# Patient Record
Sex: FEMALE | Race: AMERICAN INDIAN OR ALASKA NATIVE
[De-identification: names, ages, dates, MRNs, and addresses within clinical notes are randomized per-mention and may not be internally consistent; named-entity substitution may affect disease eponyms.]

---

## 2017-06-07 ENCOUNTER — HOSPITAL ENCOUNTER (OUTPATIENT)
Dept: HOSPITAL 5 - OPU | Age: 52
Discharge: HOME | End: 2017-06-07
Attending: RADIOLOGY
Payer: MEDICARE

## 2017-06-07 VITALS — DIASTOLIC BLOOD PRESSURE: 100 MMHG | SYSTOLIC BLOOD PRESSURE: 158 MMHG

## 2017-06-07 DIAGNOSIS — Z98.890: ICD-10-CM

## 2017-06-07 DIAGNOSIS — T82.898A: Primary | ICD-10-CM

## 2017-06-07 DIAGNOSIS — Z79.899: ICD-10-CM

## 2017-06-07 DIAGNOSIS — Z98.51: ICD-10-CM

## 2017-06-07 DIAGNOSIS — Z99.2: ICD-10-CM

## 2017-06-07 DIAGNOSIS — N18.6: ICD-10-CM

## 2017-06-07 DIAGNOSIS — Z83.3: ICD-10-CM

## 2017-06-07 DIAGNOSIS — I12.0: ICD-10-CM

## 2017-06-07 DIAGNOSIS — N17.9: ICD-10-CM

## 2017-06-07 PROCEDURE — C1769 GUIDE WIRE: HCPCS

## 2017-06-07 PROCEDURE — 36902 INTRO CATH DIALYSIS CIRCUIT: CPT

## 2017-06-07 PROCEDURE — 76937 US GUIDE VASCULAR ACCESS: CPT

## 2017-06-07 PROCEDURE — C1894 INTRO/SHEATH, NON-LASER: HCPCS

## 2017-06-07 PROCEDURE — 84132 ASSAY OF SERUM POTASSIUM: CPT

## 2017-06-07 PROCEDURE — C1887 CATHETER, GUIDING: HCPCS

## 2017-06-07 PROCEDURE — 36415 COLL VENOUS BLD VENIPUNCTURE: CPT

## 2017-06-07 PROCEDURE — C1725 CATH, TRANSLUMIN NON-LASER: HCPCS

## 2017-06-07 PROCEDURE — C1751 CATH, INF, PER/CENT/MIDLINE: HCPCS

## 2017-06-07 NOTE — SHORT STAY SUMMARY
Short Stay Documentation


Date of service: 06/07/17


Narrative H&P: 


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





- Allergies and Medications


Current Medications: 


 Allergies





No Known Allergies Allergy (Verified 06/07/17 12:33)


 





 Home Medications











 Medication  Instructions  Recorded  Confirmed  Last Taken  Type


 


ALPRAZolam [Xanax TAB] 0.25 mg PO DAILY 06/07/17 06/07/17 06/06/17 History


 


Carvedilol [Coreg] 6.25 mg PO BID 06/07/17 06/07/17 06/06/17 History


 


Cinacalcet HCl [Sensipar] 90 mg PO QHS 06/07/17 06/07/17 06/06/17 History


 


Digoxin [Lanoxin] 0.125 mg PO DAILY 06/07/17 06/07/17 06/06/17 History


 


Dilayvite 800 mg PO DAILY 06/07/17 06/06/17 History


 


Esomeprazole Magnesium [NexIUM] 40 mg PO QDAY 06/07/17 06/07/17 06/06/17 History


 


Fluticasone [Flonase] 2 spray NS QDAY 06/07/17 06/07/17 06/06/17 History


 


Loratadine [Allergy Relief] 10 mg PO Q48H 06/07/17 06/07/17 06/06/17 History


 


Sevelamer Carbonate [Renvela] 800 mg PO TIDWM 06/07/17 06/07/17 06/06/17 History


 


traMADol [Ultram] 50 mg PO Q4HR PRN 06/07/17 06/07/17 Unknown History








Active Medications





Cefazolin Sodium (Ancef/Sterile Water 2 Gm/20 Ml)  2 gm in 20 mls @ 80 mls/hr 

IV PREOP NR


   PRN Reason: Protocol


   Stop: 06/07/17 23:00











- Physical exam


General appearance: no acute distress


HEENT: Other (severe facial and head edema)


Lungs: Normal air movement


Extremities: abnormal (large LUE AVF)





- Brief post op/procedure progress note


Date of procedure: 06/07/17


Pre-op diagnosis: SVC syndrome


Post-op diagnosis: same


Procedure: 





angioplasty


Anesthesia: local (w/ conscious sedation)


Surgeon: RICHMOND MA


Estimated blood loss: minimal


Condition: stable





- Hospital course


Hospital course: 





Ready for discharge.





- Disposition


Condition at discharge: Stable


Disposition: DC-01 TO HOME OR SELFCARE





- Discharge Diagnoses


(1) SVC syndrome


Status: Acute   





Short Stay Discharge Plan


Activity: advance as tolerated


Weight Bearing Status: Weight Bear as Tolerated


Diet: renal


Wound: keep clean and dry


Follow up with: 


PRIMARY CARE,MD [Primary Care Provider] - 7 Days

## 2017-06-07 NOTE — OPERATIVE REPORT
Operative Report


Operative Report: 





EXAM:


1.  Ultrasound-guided access of the left arm AV fistula towards the venous 

outflow


2.  Fistulogram


3.  Placement of a 6 Estonian sheath which was upsized to a 7 Estonian sheath


4.  Angioplasty of the superior vena cava with a 9 mm angioplasty balloon


5.  Angioplasty of the superior vena cava with an 8 mm cutting angioplasty 

balloon


6.  Angioplasty of the superior vena cava with a 10 mm conquest angioplasty 

balloon


7.  Angioplasty of the cephalic arch with a 10 mm conquest angioplasty balloon


8.  Angioplasty of the mid cephalic vein with a 10 mm conquest angioplasty 

balloon





DATE: 6/7/17





: RICHMOND MA MD





INDICATION: 51-year-old female with left upper extremity AV fistula with SVC 

syndrome.





MEDICATIONS: Please see nursing report for full details.





DEVICES:


8 mm cutting angioplasty balloon


9 mm angioplasty balloon


10 mm conquest angioplasty balloon





CONTRAST: Please see Lab report for full details.





PROCEDURE:


The risks, benefits, and alternatives were discussed with the patient; written 

informed consent was obtained.





The patient's left upper extremity was prepped and draped in a sterile fashion.

  





Ultrasound was used to evaluate the left upper extremity AV fistula which was 

patent.  Under direct ultrasound guidance, the left upper extremity AV fistula 

was accessed with a 21-gauge micropuncture needle.  0.018 wire was passed to 

the AV fistula.  Needle was exchanged for transitional dilator.  Transitional 

dilator was exchanged over 0.035 inch wire for 6 Estonian sheath.  





Digital subtraction angiography was performed demonstrating a left upper 

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  The 

midportion of the cephalic vein has a focal 40% narrowing.  There is a focal 40

% narrowing in the cephalic arch.  The left innominate vein is patent.  The SVC 

is not filling directly, and the azygous vein is filling through collaterals.





The sheath was then upsized to a 7 [place]ian 45 cm [place] destination positioned 

in the left innominate vein.





Digital subtraction angiography was performed demonstrating the SVC occlusion.





Using a vertebral catheter and a Glidewire advantage, the SVC occlusion was 

crossed.  Digital subtraction angiography was performed in the SVC confirming 

patency of the SVC a few centimeters below the azygous vein.  The SVC at the 

level of the azygous vein was occluded.





Wire was passed into the IVC.





9 mm angioplasty balloon was advanced over the wire and just perform 

angioplasty of the superior vena cava.  This was performed at burst pressure 

and slightly above burst pressure, but the focal narrowings persisted.





Wire was exchanged for a 0.018 inch treasure wire and 8 mm cutting balloon was 

advanced over the wire and used to perform cutting angioplasty at the area of 

focal narrowing.  Digital subtraction angiography demonstrated improved flow 

without any extravasation or pseudoaneurysm.





Afterwards, wire was exchanged for 0.035 inch Glidewire advantage and 10 mm 

angioplasty balloon was advanced over the wire.  Conquest balloon was used to 

perform angioplasty of the SVC at high pressure for prolonged period of time.  

Digital subtraction angiography was performed demonstrating improved flow into 

the SVC.  There is still a focal narrowing at the level of the azygous vein, 

but there is now flow through the SVC.





10 mm angioplasty balloon was then used to perform angioplasty of the cephalic 

arch and the mid cephalic vein.  Digital subtraction angiography demonstrated 

resolution of the narrowing at these areas.





7 Estonian sheath was then removed and the site was closed with 3-0 Vicryl.  

Patient tolerated the procedure well.  No immediate postprocedural complication.





FINDINGS: Please see procedure note above





IMPRESSION: 


1.  Successful cutting balloon angioplasty and high pressure angioplasty 

balloon angioplasty of the SVC with restoration of flow into the SVC.


2.  Angioplasty of the midportion of the cephalic vein and cephalic arch with a 

10 mm angioplasty balloon.

## 2017-06-08 NOTE — VASCULAR LAB REPORT
MISCELLANEOUS VESSEL IDENTIFICATION:



The arteriovenous access was identified in the left upper extremity and 

under real-time

ultrasound guidance was cannulated.





IMPRESSION:



Successful ultrasound guided cannulation of the arteriovenous access 

site.

## 2019-04-10 NOTE — ANESTHESIA CONSULTATION
Anesthesia Consult and Med Hx





- Airway


ROM Head & Neck: Adequate


Mental/Hyoid Distance: Adequate


Mallampati Class: Class II


Intubation Access Assessment: Probably Good





- Pulmonary Exam


CTA: Yes





- Cardiac Exam


Cardiac Exam: RRR





- Pre-Operative Health Status


ASA Pre-Surgery Classification: ASA4


Proposed Anesthetic Plan: General, Spinal





- Pre-Anesthesia Comment


Pre-Anesthesia Comments: Discussed SAB and GETA. Risks, Benefits, Options 

explained. Pt prefers to be knocked out and not know anything. Agrees to either





- Pulmonary


Hx Smoking: Yes (1 pack / week)


Hx Respiratory Symptoms: Yes ((+) TB - Resolved w/ Meds.Chronic bronchitis, no 

problems in several months)


Hx Pneumonia: Yes (Several years. Episode when diagnosed with HIV)


Hx Sleep Apnea: Yes





- Cardiovascular System


Hx Hypertension: Yes





- Central Nervous System


Hx Psychiatric Problems: Yes (Schizophrenia)





- Endocrine


Hx Renal Disease: Yes


Hx End Stage Renal Disease: Yes


Hx Liver Disease: Yes (Hep C)





- Hematic


Hx Anemia: Yes





- Other Systems


Hx Alcohol Use: Yes (1st stated 32 beers/week. Changed account to case/month)


Hx Substance Use: Yes (Former cocaine use. Quit ~1yr ago)


Hx Cancer: No


Hx Obesity: No





- Additional Comments


Anesthesia Medical History Comments: Pt with h/o chronic pain hips and knees. 

Ambulatory with crutches.  H/O: (+)HIV, (+)TB, (+) Hep C, (+) Cocaine, alcohol, 

and smoker, Anemia, HTN, Bronchitis

## 2019-04-11 ENCOUNTER — HOSPITAL ENCOUNTER (OUTPATIENT)
Dept: HOSPITAL 5 - CATHLABREC | Age: 54
Discharge: HOME | End: 2019-04-11
Attending: RADIOLOGY
Payer: MEDICARE

## 2019-04-11 VITALS — SYSTOLIC BLOOD PRESSURE: 168 MMHG | DIASTOLIC BLOOD PRESSURE: 105 MMHG

## 2019-04-11 DIAGNOSIS — T82.898A: Primary | ICD-10-CM
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
## 2019-04-11 DIAGNOSIS — Z79.82: ICD-10-CM
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
## 2019-04-11 DIAGNOSIS — G47.30: ICD-10-CM

## 2019-04-11 DIAGNOSIS — N18.6: ICD-10-CM

## 2019-04-11 DIAGNOSIS — E78.00: ICD-10-CM

## 2019-04-11 DIAGNOSIS — T82.591A: ICD-10-CM

## 2019-04-11 DIAGNOSIS — F41.9: ICD-10-CM

## 2019-04-11 DIAGNOSIS — Z98.890: ICD-10-CM

## 2019-04-11 DIAGNOSIS — Z79.899: ICD-10-CM

## 2019-04-11 DIAGNOSIS — Z99.2: ICD-10-CM

## 2019-04-11 DIAGNOSIS — Z72.89: ICD-10-CM

## 2019-04-11 DIAGNOSIS — Z86.2: ICD-10-CM

## 2019-04-11 DIAGNOSIS — K21.9: ICD-10-CM

## 2019-04-11 DIAGNOSIS — I12.0: ICD-10-CM

## 2019-04-11 DIAGNOSIS — Z95.5: ICD-10-CM

## 2019-04-11 DIAGNOSIS — F17.210: ICD-10-CM

## 2019-04-11 LAB
APTT BLD: 24 SEC. (ref 24.2–36.6)
INR PPP: 0.98 (ref 0.87–1.13)

## 2019-04-11 PROCEDURE — 85730 THROMBOPLASTIN TIME PARTIAL: CPT

## 2019-04-11 PROCEDURE — 76937 US GUIDE VASCULAR ACCESS: CPT

## 2019-04-11 PROCEDURE — 99157 MOD SED OTHER PHYS/QHP EA: CPT

## 2019-04-11 PROCEDURE — 85610 PROTHROMBIN TIME: CPT

## 2019-04-11 PROCEDURE — C1751 CATH, INF, PER/CENT/MIDLINE: HCPCS

## 2019-04-11 PROCEDURE — 36415 COLL VENOUS BLD VENIPUNCTURE: CPT

## 2019-04-11 PROCEDURE — 36902 INTRO CATH DIALYSIS CIRCUIT: CPT

## 2019-04-11 PROCEDURE — 99156 MOD SED OTH PHYS/QHP 5/>YRS: CPT

## 2019-04-11 PROCEDURE — C1769 GUIDE WIRE: HCPCS

## 2019-04-11 PROCEDURE — C1725 CATH, TRANSLUMIN NON-LASER: HCPCS

## 2019-04-11 PROCEDURE — C1894 INTRO/SHEATH, NON-LASER: HCPCS

## 2019-04-11 PROCEDURE — 84132 ASSAY OF SERUM POTASSIUM: CPT

## 2019-04-11 RX ADMIN — FENTANYL CITRATE ONE MCG: 50 INJECTION, SOLUTION INTRAMUSCULAR; INTRAVENOUS at 09:45

## 2019-04-11 RX ADMIN — MIDAZOLAM ONE MG: 1 INJECTION INTRAMUSCULAR; INTRAVENOUS at 09:45

## 2019-04-11 RX ADMIN — MIDAZOLAM ONE MG: 1 INJECTION INTRAMUSCULAR; INTRAVENOUS at 09:20

## 2019-04-11 RX ADMIN — SODIUM CHLORIDE SCH MLS: 0.9 INJECTION, SOLUTION INTRAVENOUS at 09:20

## 2019-04-11 RX ADMIN — SODIUM CHLORIDE SCH MLS/HR: 0.9 INJECTION, SOLUTION INTRAVENOUS at 08:40

## 2019-04-11 RX ADMIN — FENTANYL CITRATE ONE MCG: 50 INJECTION, SOLUTION INTRAMUSCULAR; INTRAVENOUS at 09:20

## 2019-04-11 NOTE — OPERATIVE REPORT
Operative Report


Operative Report: 





EXAM: 


1.  Ultrasound guided access of the AV fistula towards the venous outflow


2.  Fistulogram.


3.  Selection of the IVC.


4.  Angioplasty of the superior SVC and peripheral left innominate vein with a 8

mm x 2 cm cutting angioplasty balloon and 12 mm x 60 mm angioplasty balloon


5.  Angioplasty of the cephalic arch (central cephalic vein) with a 12 mm x 60 

mm angioplasty





DATE: 4/11/19





: RICHMOND MA MD





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





MEDICATIONS: Please see nursing report for full details.





DEVICES: 


8 mm x 2 cm cutting angioplasty balloon


12 mm x 6 cm angioplasty balloon





PROCEDURE: 


The risks, benefits, and alternatives of the procedure were discussed and 

written informed consent was obtained.  The patient was transported in stable 

condition to the angiography suite.  The patient's left arm AV fistula was 

assessed by ultrasound and was patent.  The patient was prepped and draped in a 

sterile fashion.





Under ultrasound guidance, the left arm AV brachiocephalic fistula was accessed 

with a 21-gauge micropuncture needle.  The area was anesthetized prior to 

access.  0.018 inch wire was advanced through the micropuncture needle into the 

fistula and then the needle was exchanged for a 5 Cuban transitional dilator.  

The inner dilator and wire were removed and a 0.035 inch wire was advanced 

through the venous outflow.  The transitional dilator was exchanged for a 7 

[place]ub[place] short sheath.





Fistulogram was performed of the venous outflow and central veins. 





The mid cephalic vein was patent.  The central cephalic vein had a 60% narrowing

(at the cephalic arch).  There were numerous collaterals arising from the left 

subclavian and innominate vein.  The left subclavian and mid and peripheral 

innominate vein were patent.  The central portion of the left innominate vein 

and the superior portion of the SVC was 90% narrowed.  The rest of the SVC was 

patent.





8 mm x 2 cm cutting angioplasty balloon was used to perform cutting angioplasty 

of the SVC and central left innominate vein.  12 mm x 6 cm angioplasty balloon 

was used to perform angioplasty of the SVC and central left innominate vein and 

then the cephalic arch.  Digital subtraction angiography demonstrated 20-30% 

residual narrowing of the left innominate vein/superior portion of the SVC and 

10-20% residual narrowing of the cephalic arch.





The wire was removed and the site was closed with a 3-0 Vicryl suture.  The 

sheath was then removed.  Hemostasis was achieved with slight manual c

ompression.  The patient was transported from the angiography suite to the floor

in stable condition.





IMPRESSION: 


1.  Successful central dialysis access angioplasty.


2.  Successful peripheral dialysis access angioplasty

## 2019-04-11 NOTE — SHORT STAY SUMMARY
Short Stay Documentation


Date of service: 04/11/19


Narrative H&P: 





53 year old female with ESRD and AVF malfunction with SVC syndrome.





- History


Principal diagnosis: AVF malfunction


Past Medical History: CAD (s/p stent), diabetes, dialysis, GERD, other (chronic 

SVC syndrome)


Past Surgical History: Other (AVF creation ; coronary stenting)


Social history: lives with family





- Allergies and Medications


Current Medications: 


                                    Allergies





No Known Allergies Allergy (Verified 06/07/17 12:33)


   





                                Home Medications











 Medication  Instructions  Recorded  Confirmed  Last Taken  Type


 


ALPRAZolam [Xanax TAB] 0.25 mg PO DAILY 06/07/17 04/11/19 04/07/19 History





    0.25mg 


 


Fluticasone [Flonase] 2 spray NS QDAY 06/07/17 04/11/19 04/10/19 History





    2 sprays 


 


Loratadine [Allergy Relief] 10 mg PO Q48H 06/07/17 04/11/19 04/10/19 History





    10mg 


 


Sevelamer Carbonate [Renvela] 800 mg PO TIDWM 06/07/17 04/11/19 04/10/19 History





    800mg 


 


Albuterol Sulfate [Albuterol 2 puff INHALATION 4XD PRN 04/11/19 04/11/19 

03/15/19 History





Sulfate Hfa]    2 p\uffs 


 


Aspirin EC [Aspirin Enteric Coated 81 mg PO DAILY 04/11/19 04/11/19 04/10/19 Hi

story





TAB]    81mg 


 


Atorvastatin [Lipitor] 80 mg PO DAILY 04/11/19 04/11/19 04/10/19 History





    80mg 


 


Lisinopril [Prinivil] 5 mg PO DAILY 04/11/19 04/11/19 04/09/19 History





    5mg 


 


Loratadine [Claritin] 10 mg PO DAILY 04/11/19 04/11/19 04/10/19 History





    10mg 


 


Metoprolol [Lopressor TAB] 0.5 tab PO BID 04/11/19 04/11/19 04/10/19 History





    0.5mg 


 


Mv,Dimitri,Min/Iron/Folic Acid/Lut 1 tab PO DAILY 04/11/19 04/11/19 04/10/19 History





[Complete Multi Tablet]    1 tab 


 


Ticagrelor [Brilinta] 90 mg PO BID 04/11/19 04/11/19 04/10/19 History





    90mg 


 


hydrOXYzine HCL [Atarax] 25 mg PO HS PRN 04/11/19 04/11/19 04/07/19 History





    25mg 


 


traMADol [Ultram 50 MG tab] 50 mg PO Q6H 04/11/19 04/11/19 04/10/19 History





    50mg 








Active Medications





Sodium Chloride (Nacl 0.9% 500 Ml)  500 mls @ 50 mls/hr IV AS DIRECT DIANDRA


   Last Admin: 04/11/19 08:40 Dose:  50 mls/hr


   Documented by: 











- Physical exam


General appearance: other (mild SVC syndrome)


Lungs: Normal air movement


Gastrointestinal: normal


Extremities: normal temperature, normal color, abnormal (large aneurysmal AVF, 

stable)





- Brief post op/procedure progress note


Date of procedure: 04/11/19


Pre-op diagnosis: ESRD, AVF malfunction


Post-op diagnosis: same


Procedure: 





angioplasty


Anesthesia: local (w/ conscious sed)


Surgeon: RICHMOND MA


Estimated blood loss: minimal


Condition: stable





- Hospital course


Hospital course: 





Tolerated procedure well.





- Disposition


Condition at discharge: Stable


Disposition: DC-01 TO HOME OR SELFCARE





- Discharge Diagnoses


(1) Malfunction of arteriovenous shunt


Status: Acute   





(2) SVC syndrome


Status: Acute   





Short Stay Discharge Plan


Activity: advance as tolerated


Weight Bearing Status: Weight Bear as Tolerated


Diet: renal


Wound: keep clean and dry


Additional Instructions: 


follow up with Primary Medical Doctor in 1 week, return to Emergency Room(ER) 

for medical emergency.


Follow up with: 


JOHN GRIDER MD [Primary Care Provider] - 7 Days


Forms:  AVG Arteriogram D/CInstruction

## 2019-07-11 ENCOUNTER — HOSPITAL ENCOUNTER (OUTPATIENT)
Dept: HOSPITAL 5 - CATHLABREC | Age: 54
Discharge: HOME | End: 2019-07-11
Attending: RADIOLOGY
Payer: MEDICARE

## 2019-07-11 VITALS — DIASTOLIC BLOOD PRESSURE: 89 MMHG | SYSTOLIC BLOOD PRESSURE: 139 MMHG

## 2019-07-11 DIAGNOSIS — Z79.899: ICD-10-CM

## 2019-07-11 DIAGNOSIS — Z98.890: ICD-10-CM

## 2019-07-11 DIAGNOSIS — T82.590A: Primary | ICD-10-CM

## 2019-07-11 DIAGNOSIS — I87.1: ICD-10-CM

## 2019-07-11 DIAGNOSIS — Z79.82: ICD-10-CM

## 2019-07-11 DIAGNOSIS — F41.9: ICD-10-CM

## 2019-07-11 DIAGNOSIS — I12.0: ICD-10-CM

## 2019-07-11 DIAGNOSIS — K21.9: ICD-10-CM
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
## 2019-07-11 DIAGNOSIS — N18.6: ICD-10-CM

## 2019-07-11 DIAGNOSIS — E78.00: ICD-10-CM

## 2019-07-11 DIAGNOSIS — Z86.2: ICD-10-CM

## 2019-07-11 DIAGNOSIS — Z72.89: ICD-10-CM

## 2019-07-11 DIAGNOSIS — G47.30: ICD-10-CM

## 2019-07-11 PROCEDURE — 84132 ASSAY OF SERUM POTASSIUM: CPT

## 2019-07-11 PROCEDURE — C1769 GUIDE WIRE: HCPCS

## 2019-07-11 PROCEDURE — 99156 MOD SED OTH PHYS/QHP 5/>YRS: CPT

## 2019-07-11 PROCEDURE — 99157 MOD SED OTHER PHYS/QHP EA: CPT

## 2019-07-11 PROCEDURE — 36902 INTRO CATH DIALYSIS CIRCUIT: CPT

## 2019-07-11 PROCEDURE — C1894 INTRO/SHEATH, NON-LASER: HCPCS

## 2019-07-11 PROCEDURE — 76937 US GUIDE VASCULAR ACCESS: CPT

## 2019-07-11 PROCEDURE — C1725 CATH, TRANSLUMIN NON-LASER: HCPCS

## 2019-07-11 PROCEDURE — 36415 COLL VENOUS BLD VENIPUNCTURE: CPT

## 2019-07-11 NOTE — OPERATIVE REPORT
Operative Report


Operative Report: 








EXAM: 


1.  Ultrasound guided access of the AV fistula towards the venous outflow


2.  Fistulogram.


3.  Selection of the IVC.


4.  Angioplasty of the superior SVC and peripheral left innominate vein with a 8

mm x 2 cm cutting angioplasty balloon and 12 mm x 60 mm angioplasty balloon


5.  Angioplasty of the cephalic arch (central cephalic vein) with a 8 mm x 2 cm 

cutting angioplasty balloon and 12 mm x 60 mm angioplasty





DATE: 7/11/19





: RICHMOND MA MD





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





MEDICATIONS: Please see nursing report for full details.





DEVICES: 


8 mm x 2 cm cutting angioplasty balloon


12 mm x 6 cm angioplasty balloon





PROCEDURE: 


The risks, benefits, and alternatives of the procedure were discussed and 

written informed consent was obtained.  The patient was transported in stable 

condition to the angiography suite.  The patient's left arm AV fistula was a

ssessed by ultrasound and was patent.  The patient was prepped and draped in a 

sterile fashion.





Under ultrasound guidance, the left arm AV brachiocephalic fistula was accessed 

with a 21-gauge micropuncture needle.  The area was anesthetized prior to 

access.  0.018 inch wire was advanced through the micropuncture needle into the 

fistula and then the needle was exchanged for a 5 Estonian transitional dilator.  

The inner dilator and wire were removed and a 0.035 inch wire was advanced 

through the venous outflow.  The transitional dilator was exchanged for a 7 

[place] short sheath.





Fistulogram was performed of the venous outflow and central veins. 





The central cephalic vein had a 60% narrowing (at the cephalic arch).  There 

were numerous collaterals arising from the left subclavian and innominate vein. 

The left subclavian and mid and peripheral innominate vein were patent.  The 

central portion of the left innominate vein and the superior portion of the SVC 

was 90% narrowed.  The rest of the SVC was patent.





8 mm x 2 cm cutting angioplasty balloon was used to perform cutting angioplasty 

of the SVC and central left innominate vein and cephalic arch. 12 mm x 6 cm 

angioplasty balloon was used to perform angioplasty of the SVC and central left 

innominate vein and then the cephalic arch.  Digital subtraction angiography 

demonstrated 20-30% residual narrowing of the left innominate vein/superior 

portion of the SVC and 20% residual narrowing of the cephalic arch.





The wire was removed and the site was closed with a 3-0 Vicryl suture.  The 

sheath was then removed.  Hemostasis was achieved with slight manual 

compression.  The patient was transported from the angiography suite to the 

floor in stable condition.





IMPRESSION: 


1.  Successful central dialysis access angioplasty.


2.  Successful peripheral dialysis access angioplasty

## 2019-07-11 NOTE — SHORT STAY SUMMARY
Short Stay Documentation


Date of service: 07/11/19


Narrative H&P: 





53 year old female with recurrent SVC syndrome and ESRD who presents for 

angioplasty.





- History


Principal diagnosis: SVC syndrome, AVF malfunction


H&P: obtained from office





- Allergies and Medications


Current Medications: 


                                    Allergies





No Known Allergies Allergy (Verified 06/07/17 12:33)


   





                                Home Medications











 Medication  Instructions  Recorded  Confirmed  Last Taken  Type


 


ALPRAZolam [Xanax TAB] 0.25 mg PO DAILY 06/07/17 07/11/19 07/10/19 History


 


Fluticasone [Flonase] 2 spray NS QDAY PRN 06/07/17 07/11/19 1 Week Ago History





    ~07/04/19 


 


Sevelamer Carbonate [Renvela] 800 mg PO TIDWM 06/07/17 07/11/19 07/10/19 History


 


Albuterol Sulfate [Albuterol 2 puff INHALATION 4XD PRN 04/11/19 07/11/19 07/08/19 History





Sulfate Hfa]     


 


Aspirin EC 81 mg PO DAILY 04/11/19 07/11/19 07/10/19 History


 


Atorvastatin [Lipitor] 80 mg PO DAILY 04/11/19 07/11/19 07/10/19 History


 


Lisinopril [Prinivil] 5 mg PO DAILY 04/11/19 07/11/19 07/10/19 History


 


Loratadine [Claritin] 10 mg PO DAILY 04/11/19 07/11/19 07/10/19 History


 


Metoprolol [Lopressor TAB] 0.5 tab PO BID 04/11/19 07/11/19 07/10/19 History


 


Mv,Dimitri,Min/Iron/Folic Acid/Lut 1 tab PO DAILY 04/11/19 07/11/19 07/10/19 History





[Complete Multi Tablet]     


 


Ticagrelor [Brilinta] 90 mg PO BID 04/11/19 07/11/19 07/10/19 History


 


hydrOXYzine HCL [Atarax] 25 mg PO HS PRN 04/11/19 07/11/19 07/10/19 History


 


traMADol [Ultram 50 MG tab] 50 mg PO Q6H 04/11/19 07/11/19 07/10/19 History














- Physical exam


General appearance: no acute distress


Lungs: Normal air movement (some coughing with supine position)


Gastrointestinal: normal


Extremities: normal temperature, normal color





- Brief post op/procedure progress note


Date of procedure: 07/11/19


Pre-op diagnosis: ESRD with SVC syndrome and AVF malfunction


Post-op diagnosis: same


Procedure: 


Peripheral dialysis access angioplasty


Central dialysis access angioplasty





Anesthesia: local (w/ conscious sedation)


Surgeon: RICHMOND MA


Estimated blood loss: minimal


Condition: stable





- Hospital course


Hospital course: 





Ready for discharge after 1 hr





- Disposition


Condition at discharge: Stable


Disposition: [place]-01 TO HOME OR SELFCARE





- Discharge Diagnoses


(1) Malfunction of arteriovenous shunt


Status: Acute   





(2) SVC syndrome


Status: Acute   





Short Stay Discharge Plan


Activity: advance as tolerated


Weight Bearing Status: Weight Bear as Tolerated


Diet: renal


Wound: keep clean and dry


Follow up with: 


JOHN GRIDER MD [Primary Care Provider] - 7 Days

## 2020-10-27 ENCOUNTER — HOSPITAL ENCOUNTER (OUTPATIENT)
Dept: HOSPITAL 5 - CATHLABREC | Age: 55
Discharge: HOME | End: 2020-10-27
Attending: SURGERY
Payer: MEDICARE

## 2020-10-27 DIAGNOSIS — Z86.2: ICD-10-CM

## 2020-10-27 DIAGNOSIS — Z79.899: ICD-10-CM

## 2020-10-27 DIAGNOSIS — T82.591A: Primary | ICD-10-CM

## 2020-10-27 DIAGNOSIS — Z87.01: ICD-10-CM

## 2020-10-27 DIAGNOSIS — Z98.890: ICD-10-CM

## 2020-10-27 DIAGNOSIS — I12.0: ICD-10-CM

## 2020-10-27 DIAGNOSIS — K21.9: ICD-10-CM

## 2020-10-27 DIAGNOSIS — I87.1: ICD-10-CM

## 2020-10-27 DIAGNOSIS — F41.9: ICD-10-CM

## 2020-10-27 DIAGNOSIS — Z95.5: ICD-10-CM

## 2020-10-27 DIAGNOSIS — N18.6: ICD-10-CM

## 2020-10-27 DIAGNOSIS — G47.30: ICD-10-CM

## 2020-10-27 DIAGNOSIS — Z79.82: ICD-10-CM

## 2020-10-27 DIAGNOSIS — Z72.89: ICD-10-CM

## 2020-10-27 PROCEDURE — C1725 CATH, TRANSLUMIN NON-LASER: HCPCS

## 2020-10-27 PROCEDURE — 36907 BALO ANGIOP CTR DIALYSIS SEG: CPT

## 2020-10-27 PROCEDURE — C1769 GUIDE WIRE: HCPCS

## 2020-10-27 PROCEDURE — 36415 COLL VENOUS BLD VENIPUNCTURE: CPT

## 2020-10-27 PROCEDURE — 80048 BASIC METABOLIC PNL TOTAL CA: CPT

## 2020-10-27 PROCEDURE — C1894 INTRO/SHEATH, NON-LASER: HCPCS

## 2020-10-27 PROCEDURE — 36902 INTRO CATH DIALYSIS CIRCUIT: CPT

## 2020-10-27 NOTE — SHORT STAY SUMMARY
Short Stay Documentation


Date of service: 10/27/20


Narrative H&P: 





55-year-old female with end-stage renal disease and left upper extremity AV 

access malfunction with recurrent SVC syndrome with head and neck swelling who 

presents for evaluation and treatment.





- History


Principal diagnosis:  AV fistula malfunction


Past Medical History: dialysis, ESRD


Past Surgical History: Other (AV access fistulograms and surgery)


Social history: no significant social history





- Allergies and Medications


Current Medications: 


                                    Allergies





No Known Allergies Allergy (Verified 06/07/17 12:33)


   





                                Home Medications











 Medication  Instructions  Recorded  Confirmed  Last Taken  Type


 


ALPRAZolam [Xanax TAB] 0.25 mg PO DAILY 06/07/17 12/17/19 12/16/19 History





    .25mg 


 


Fluticasone [Flonase] 2 spray NS QDAY PRN 06/07/17 12/17/19 09/20/19 History





    2 sprays 


 


Albuterol Sulfate [Albuterol 2 puff INHALATION 4XD PRN 04/11/19 12/17/19 12/11/19 History





Sulfate Hfa]    2 puffs 


 


Aspirin EC [Halfprin EC] 81 mg PO DAILY 04/11/19 12/17/19 12/16/19 History





    81 mg 


 


Atorvastatin [Lipitor] 80 mg PO DAILY 04/11/19 12/17/19 12/16/19 History





    80 mg 


 


Loratadine (Nf) [Claritin (Nf)] 10 mg PO DAILY 04/11/19 12/17/19 09/01/19 

History





    10 mg 


 


Metoprolol [Lopressor TAB] 0.5 tab PO BID 04/11/19 12/17/19 12/16/19 History





    12.5mg 


 


Mv,Dimitri,Min/Iron/Folic Acid/Lut 1 tab PO DAILY 04/11/19 12/17/19 12/15/19 History





[Complete Multi Tablet]    1 tab 


 


Ticagrelor [Brilinta] 90 mg PO BID 04/11/19 12/17/19 12/16/19 History





    90 mg 


 


lisinopriL [Prinivil] 5 mg PO DAILY 04/11/19 12/17/19 12/16/19 History





    5 mg 


 


traMADoL [Ultram 50 MG tab] 50 mg PO Q6H 04/11/19 12/17/19 12/15/19 History





    50 mg 


 


Ferric Citrate (Nf) [Auryxia] 4 tab PO AC 12/17/19 12/17/19 12/16/19 History





    4 tabs 


 


Hydralazine HCl 50 mg PO DAILY 12/17/19 12/17/19 12/16/19 History





    50 mg 


 


NIFEdipine XL [Procardia Xl] 60 mg PO DAILY 12/17/19 12/17/19 12/16/19 History





    60 mg 


 


carvediloL [Coreg] 25 mg PO BID 12/17/19 12/17/19 12/16/19 History





    25 mg 














- Physical exam


General appearance: mild distress (Head and neck swelling)


HEENT: Other (head and neck swelling)


Lungs: Other (good air movement)


Gastrointestinal: normal


Extremities: normal temperature, normal color





- Brief post op/procedure progress note


Date of procedure: 10/28/20


Pre-op diagnosis: AVF malfunction


Post-op diagnosis: same


Procedure: 





1.  Ultrasound-guided access of the left arm cephalic vein of the AV fistula


2.  Fistulogram


3.  Selection of the SVC with angiography.


4.  Angioplasty of the left distal innominate vein/proximal SVC with a 8 mm x 2 

cm cutting angioplasty balloon and then a 12 mm x 4 cm iNPACT balloon


5.  Angioplasty of the cephalic arch with a 12 mm x 4 cm iNPACT balloon


Anesthesia: local


Surgeon: RICHMOND MA


Estimated blood loss: minimal


Condition: stable





- Hospital course


Hospital course: 





Tolerated the procedure well.  No immediate postprocedural complications.





- Disposition


Condition at discharge: Stable


Disposition: DC-01 TO HOME OR SELFCARE





- Discharge Diagnoses


(1) ESRD (end stage renal disease) on dialysis


Status: Acute   





(2) Malfunction of arteriovenous shunt


Status: Acute   





(3) SVC syndrome


Status: Acute   





Short Stay Discharge Plan


Activity: advance as tolerated


Weight Bearing Status: Weight Bear as Tolerated


Diet: renal


Wound: keep clean and dry


Follow up with: 


JOHN GRIDER MD [Primary Care Provider] - 7 Days

## 2020-10-27 NOTE — OPERATIVE REPORT
Operative Report


Operative Report: 





1.  Ultrasound guided access of the AV fistula towards the venous outflow


2.  Fistulogram.


3.  Selection of the IVC.


4.  Angioplasty of the superior SVC and peripheral left innominate vein with a 8

mm x 2 cm cutting angioplasty balloon and 12 mm x 40 mm iNPACT balloon 


5.  Angioplasty of the cephalic arch (central cephalic vein) with a 12 mm x 40 

mm iNPACT angioplasty balloon





DATE: 10/27/2020





: RICHMOND MA MD





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





MEDICATIONS: Please see nursing report for full details.





DEVICES: 


8 mm x 2 cm cutting angioplasty balloon


12 mm x 4 cm iNPACT angioplasty balloon





PROCEDURE: 


The risks, benefits, and alternatives of the procedure were discussed and 

written informed consent was obtained.  The patient was transported in stable 

condition to the angiography suite.  The patient's left arm AV fistula was 

assessed by ultrasound and was patent.  The patient was prepped and draped in a 

sterile fashion.





Under ultrasound guidance, the left arm AV brachiocephalic fistula was accessed 

with a 21-gauge micropuncture needle.  The area was anesthetized prior to 

access.  0.018 inch wire was advanced through the micropuncture needle into the 

fistula and then the needle was exchanged for a 5 Luxembourger transitional dilator.  

The inner dilator and wire were removed and a 0.035 inch wire was advanced 

through the venous outflow.  The transitional dilator was exchanged for a 7 

Luxembourger short sheath.





Fistulogram was performed of the venous outflow and central veins. 





The central cephalic vein had a 50% narrowing (at the cephalic arch).  There 

were numerous collaterals arising from the left subclavian and innominate vein. 

The left subclavian and mid and peripheral innominate vein were patent.  The 

central portion of the left innominate vein and the superior portion of the SVC 

was 80% narrowed.  The rest of the SVC was patent.





8 mm x 2 cm cutting angioplasty balloon was used to perform cutting angioplasty 

of the SVC and central left innominate vein. Wire was exchanged for a Glidewire 

Advantage. Sheath was then upsized for a 9 Fr sheath. 12 mm x 4 cm iNPACT 

angioplasty balloon was used to perform angioplasty of the SVC and central left 

innominate vein and then the cephalic arch.  Digital subtraction angiography 

demonstrated 30% residual narrowing of the left innominate vein/superior portion

of the SVC and 20% residual narrowing of the cephalic arch.





The wire was removed and the site was closed with a 3-0 Vicryl suture.  The sh

eath was then removed.  Hemostasis was achieved with slight manual compression. 

The patient was transported from the angiography suite to the floor in stable 

condition.





IMPRESSION: 


1.  Successful central dialysis access angioplasty.


2.  Successful peripheral dialysis access angioplasty

## 2020-10-29 LAB
BUN SERPL-MCNC: 46 MG/DL (ref 7–17)
BUN/CREAT SERPL: 4 %
CALCIUM SERPL-MCNC: 10.7 MG/DL (ref 8.4–10.2)

## 2021-07-20 ENCOUNTER — LAB OUTSIDE AN ENCOUNTER (OUTPATIENT)
Dept: URBAN - METROPOLITAN AREA CLINIC 118 | Facility: CLINIC | Age: 56
End: 2021-07-20

## 2021-07-20 ENCOUNTER — DASHBOARD ENCOUNTERS (OUTPATIENT)
Age: 56
End: 2021-07-20

## 2021-07-20 ENCOUNTER — CLAIMS CREATED FROM THE CLAIM WINDOW (OUTPATIENT)
Dept: URBAN - METROPOLITAN AREA CLINIC 118 | Facility: CLINIC | Age: 56
End: 2021-07-20
Payer: COMMERCIAL

## 2021-07-20 DIAGNOSIS — R13.19 ESOPHAGEAL DYSPHAGIA: ICD-10-CM

## 2021-07-20 DIAGNOSIS — K21.9 GASTROESOPHAGEAL REFLUX DISEASE, UNSPECIFIED WHETHER ESOPHAGITIS PRESENT: ICD-10-CM

## 2021-07-20 DIAGNOSIS — R11.2 NON-INTRACTABLE VOMITING WITH NAUSEA, UNSPECIFIED VOMITING TYPE: ICD-10-CM

## 2021-07-20 DIAGNOSIS — R14.0 ABDOMINAL DISTENSION: ICD-10-CM

## 2021-07-20 PROCEDURE — 99244 OFF/OP CNSLTJ NEW/EST MOD 40: CPT | Performed by: INTERNAL MEDICINE

## 2021-07-20 PROCEDURE — 99204 OFFICE O/P NEW MOD 45 MIN: CPT | Performed by: INTERNAL MEDICINE

## 2021-07-20 RX ORDER — HYDROCORTISONE 10 MG/1
1 TABLET WITH FOOD OR MILK TABLET ORAL
Status: ACTIVE | COMMUNITY

## 2021-07-20 RX ORDER — CARVEDILOL 25 MG/1
1 TABLET WITH FOOD TABLET, FILM COATED ORAL TWICE A DAY
Status: ACTIVE | COMMUNITY

## 2021-07-20 RX ORDER — ONDANSETRON 4 MG/1
1 TABLET ON THE TONGUE AND ALLOW TO DISSOLVE TABLET, ORALLY DISINTEGRATING ORAL ONCE A DAY
Qty: 30 | Refills: 0 | OUTPATIENT
Start: 2021-07-20

## 2021-07-20 RX ORDER — CINACALCET HYDROCHLORIDE 90 MG/1
1 TABLET WITH FOOD OR AFTER A MEAL TABLET, COATED ORAL ONCE A DAY
Status: ACTIVE | COMMUNITY

## 2021-07-20 RX ORDER — PANTOPRAZOLE SODIUM 40 MG/1
1 TABLET TABLET, DELAYED RELEASE ORAL ONCE A DAY
Qty: 90 TABLET | Refills: 0 | OUTPATIENT
Start: 2021-07-20

## 2021-07-20 NOTE — PHYSICAL EXAM SKIN:
L arm AV fistulas present, no rashes,  no suspicious lesions,  no areas of discoloration,  no jaundice present,  good turgor,  no masses,  no tenderness on palpation

## 2021-07-20 NOTE — PHYSICAL EXAM HENT:
"Oncology Rooming Note    August 7, 2017 8:28 AM   Reuben Padilla is a 57 year old male who presents for:    Chief Complaint   Patient presents with     Oncology Clinic Visit     cruz majano      Initial Vitals: /76  Pulse 68  Temp 98.2  F (36.8  C) (Oral)  Wt (!) 163.6 kg (360 lb 11.2 oz)  SpO2 97%  BMI 41.69 kg/m2 Estimated body mass index is 41.69 kg/(m^2) as calculated from the following:    Height as of 7/25/17: 1.981 m (6' 5.99\").    Weight as of this encounter: 163.6 kg (360 lb 11.2 oz). Body surface area is 3 meters squared.  Data Unavailable Comment: Data Unavailable   No LMP for male patient.  Allergies reviewed: Yes  Medications reviewed: Yes    Medications: Medication refills not needed today.  Pharmacy name entered into [employer]: [id number] - [place], [place] - [address] [place] [address] AT [address]    Clinical concerns: no doc was NOT notified.    5 minutes for nursing intake (face to face time)     Raphael Fleming CMA              " Head,  normocephalic,  atraumatic,  Face,  Face within normal limits,  Ears,  External ears within normal limits,  Nose/Nasopharynx,  External nose  normal appearance,  nares patent,  no nasal discharge

## 2021-07-20 NOTE — HPI-TODAY'S VISIT:
54 yo F with PMH ESRD on dialysis MWF, cardiac stent placement was referred by Lisbeth Díaz for evaluation of dyspepsia, dysphagia. She reports sx onset x6 months ago, worse x1 month. She is complaining of dysphagia to solids only, nausea/vomiting (~1x/wk), upper abd pain, frequent heartburn. Has been on Nexium before, but not taking PPI now. She denies constipation/diarrhea, GI blood loss, wt loss. She states that shad EGD/colonoscopy last yr. EGD unremarkable, Colonoscopy had to be repeated 1 wk later per pt. Pt unsure of details. She will bring copy of records.

## 2021-07-20 NOTE — PHYSICAL EXAM GASTROINTESTINAL
Abdomen,  soft, nontender, abdominal distention,  no guarding or rigidity,  no masses palpable,  normal bowel sounds,  Liver and Spleen, no hepatosplenomegaly

## 2021-08-13 PROBLEM — 235595009: Status: ACTIVE | Noted: 2021-07-20

## 2021-08-13 PROBLEM — 271832001 FLATULENCE, ERUCTATION AND GAS PAIN: Status: ACTIVE | Noted: 2021-07-20

## 2021-08-13 PROBLEM — 40890009: Status: ACTIVE | Noted: 2021-07-20

## 2021-08-13 PROBLEM — 16932000: Status: ACTIVE | Noted: 2021-07-20

## 2021-08-26 ENCOUNTER — OFFICE VISIT (OUTPATIENT)
Dept: URBAN - METROPOLITAN AREA MEDICAL CENTER 16 | Facility: MEDICAL CENTER | Age: 56
End: 2021-08-26
Payer: COMMERCIAL

## 2021-08-26 DIAGNOSIS — K20.80 ESOPHAGITIS DISSECANS SUPERFICIALIS: ICD-10-CM

## 2021-08-26 DIAGNOSIS — K22.2 ACQUIRED ESOPHAGEAL RING: ICD-10-CM

## 2021-08-26 DIAGNOSIS — K29.60 ADENOPAPILLOMATOSIS GASTRICA: ICD-10-CM

## 2021-08-26 PROCEDURE — 43450 DILATE ESOPHAGUS 1/MULT PASS: CPT | Performed by: INTERNAL MEDICINE

## 2021-08-26 PROCEDURE — 43239 EGD BIOPSY SINGLE/MULTIPLE: CPT | Performed by: INTERNAL MEDICINE

## 2021-08-26 RX ORDER — HYDROCORTISONE 10 MG/1
1 TABLET WITH FOOD OR MILK TABLET ORAL
Status: ACTIVE | COMMUNITY

## 2021-08-26 RX ORDER — CINACALCET HYDROCHLORIDE 90 MG/1
1 TABLET WITH FOOD OR AFTER A MEAL TABLET, COATED ORAL ONCE A DAY
Status: ACTIVE | COMMUNITY

## 2021-08-26 RX ORDER — ONDANSETRON 4 MG/1
1 TABLET ON THE TONGUE AND ALLOW TO DISSOLVE TABLET, ORALLY DISINTEGRATING ORAL ONCE A DAY
Qty: 30 | Refills: 0 | Status: ACTIVE | COMMUNITY
Start: 2021-07-20

## 2021-08-26 RX ORDER — CARVEDILOL 25 MG/1
1 TABLET WITH FOOD TABLET, FILM COATED ORAL TWICE A DAY
Status: ACTIVE | COMMUNITY

## 2021-08-26 RX ORDER — PANTOPRAZOLE SODIUM 40 MG/1
1 TABLET TABLET, DELAYED RELEASE ORAL ONCE A DAY
Qty: 90 TABLET | Refills: 0 | Status: ACTIVE | COMMUNITY
Start: 2021-07-20

## 2021-09-02 ENCOUNTER — HOSPITAL ENCOUNTER (OUTPATIENT)
Dept: HOSPITAL 5 - CATH | Age: 56
Discharge: HOME | End: 2021-09-02
Attending: RADIOLOGY
Payer: MEDICARE

## 2021-09-02 VITALS — SYSTOLIC BLOOD PRESSURE: 166 MMHG | DIASTOLIC BLOOD PRESSURE: 106 MMHG

## 2021-09-02 DIAGNOSIS — Z79.82: ICD-10-CM

## 2021-09-02 DIAGNOSIS — K21.9: ICD-10-CM

## 2021-09-02 DIAGNOSIS — E78.00: ICD-10-CM

## 2021-09-02 DIAGNOSIS — I12.0: Primary | ICD-10-CM

## 2021-09-02 DIAGNOSIS — I87.1: ICD-10-CM

## 2021-09-02 DIAGNOSIS — N18.6: ICD-10-CM

## 2021-09-02 DIAGNOSIS — Z98.890: ICD-10-CM

## 2021-09-02 DIAGNOSIS — F41.9: ICD-10-CM

## 2021-09-02 DIAGNOSIS — Z79.899: ICD-10-CM

## 2021-09-02 PROCEDURE — 36415 COLL VENOUS BLD VENIPUNCTURE: CPT

## 2021-09-02 PROCEDURE — C1894 INTRO/SHEATH, NON-LASER: HCPCS

## 2021-09-02 PROCEDURE — 36902 INTRO CATH DIALYSIS CIRCUIT: CPT

## 2021-09-02 PROCEDURE — C1769 GUIDE WIRE: HCPCS

## 2021-09-02 PROCEDURE — C1725 CATH, TRANSLUMIN NON-LASER: HCPCS

## 2021-09-02 PROCEDURE — 84132 ASSAY OF SERUM POTASSIUM: CPT

## 2021-09-02 NOTE — OPERATIVE REPORT
Operative Report


Operative Report: 





Exam: Left upper arm fistulogram with central and peripheral venoplasty





Clinical indication: Patient with history of end-stage renal disease on 

hemodialysis through a left upper arm brachiocephalic fistula with cephalic arch

and central venous stenosis





Date: 09/02/2021





Procedure: Following an explanation of the risks, benefits and alternatives; 

written informed consent was obtained.  The patient was brought to the 

angiographic suite and placed in supine position on the examination table.  

Initial ultrasound evaluation of the fistula demonstrated multiple large 

pseudoaneurysms.  A palpable thrill was present throughout each.  The smallest 

inferior most pseudoaneurysm was chosen for cannulation.  Under ultrasound 

guidance, a 7 cm 21-gauge needle was advanced into the fistula.  A 0.018 

guidewire was advanced centrally.  The needle was removed and a microsheath 

placed.  A 0.018 guidewire was exchanged for a 0.035 guidewire and the micro 

sheath exchanged for a 7 Pakistani vascular sheath.





A vertebral catheter was utilized over the guidewire to manipulate the guidewire

centrally given the tortuosity of the fistula.  The guidewire was exchanged for 

an advantage guidewire which was advanced into the proximal right atrium.  The 7

Pakistani access sheath was exchanged for a 7 Pakistani 23 cm sheath to remove some of

the tortuosity.  Contrast was injected and imaging obtained at multiple 

locations.  This demonstrates no significant stenosis involving the SVC, left 

innominate, left subclavian and left cephalic vein.  Attempts to remove the 

tortuosity within the body of the fistula were performed using 8 mm x 40 mm 

balloon without significant reduction of the tortuosity.  The central veins and 

peripheral veins were treated using 12 mm x 40 mm balloon insufflated to 2 to 5 

osmar at multiple locations for 10 to 20 seconds throughout the length of the 

central venous system from the SVC to the cephalic arch.  Post intervention 

imaging demonstrated improved luminal flow throughout the cephalic arch and 

central veins.  At this point, the catheters, guidewires and sheaths were 

removed and hemostasis achieved using a 3-0 Vicryl suture, manual compression 

and Dermabond.  Sterile dressing was applied.





The patient tolerated the procedure well.  There were no immediate postprocedure

complications.





A minimal amount of conscious sedation was performed under the guidance of 

radiologic nursing.  Continuous cardiopulmonary monitoring was utilized.





Impression: 1) Left upper extremity fistulogram demonstrating significant 

stenosis within the SVC, left innominate vein, left subclavian vein and left 

cephalic arch.  Multiple pseudoaneurysms are present within the fistula.  2) 

Venoplasty of the central veins and cephalic arch using a 12 mm x 40 mm balloon 

with brisk flow and luminal flow at the conclusion of the procedure.  The 

patient will return when she experiences recurrent swelling for additional 

central venoplasty.

## 2021-09-02 NOTE — SHORT STAY SUMMARY
Short Stay Documentation


Date of service: 09/02/21





- History


Principal diagnosis: Malfunctioning dialysis access


Past Medical History: dialysis, ESRD


Past Surgical History: Other (Left upper arm AV fistula with central venous 

stenosis)


Social history: no significant social history





- Allergies and Medications


Current Medications: 


                                    Allergies





No Known Allergies Allergy (Verified 06/07/17 12:33)


   





                                Home Medications











 Medication  Instructions  Recorded  Confirmed  Last Taken  Type


 


ALPRAZolam [Xanax TAB] 0.25 mg PO DAILY 06/07/17 09/02/21 09/01/21 History





    0.25mg 


 


Albuterol Sulfate [Albuterol 2 puff INHALATION 4XD PRN 04/11/19 09/02/21 08/31/21 History





Sulfate Hfa]    2 puffs 


 


Aspirin EC [Halfprin EC] 81 mg PO DAILY 04/11/19 09/02/21 09/01/21 History





    81 mg 


 


Atorvastatin [Lipitor] 80 mg PO DAILY 04/11/19 09/02/21 09/01/21 History





    80 mg 


 


Mv,Dimitri,Min/Iron/Folic Acid/Lut 1 tab PO DAILY 04/11/19 09/02/21 08/28/21 History





[Complete Multi Tablet]    1 tab 


 


lisinopriL [Prinivil] 5 mg PO DAILY 04/11/19 09/02/21 09/01/21 History





    5 mg 


 


traMADoL [Ultram 50 MG tab] 50 mg PO Q6H 04/11/19 09/02/21 09/01/21 History





    50 mg 


 


Hydralazine HCl 50 mg PO DAILY 12/17/19 09/02/21 09/01/21 History





    50 mg 


 


NIFEdipine XL [Procardia Xl] 60 mg PO DAILY 12/17/19 09/02/21 09/01/21 History





    60 mg 


 


carvediloL [Coreg] 25 mg PO BID 12/17/19 09/02/21 09/01/21 History





    1000 








Active Medications





Sodium Chloride (Nacl 0.9% 500 Ml)  500 mls @ 50 mls/hr IV AS DIRECT DIANDRA











- Physical exam


General appearance: no acute distress


HEENT: Other (Head and neck swelling)


Lungs: Normal air movement


Breasts: deferred


Heart: Regular rate


Gastrointestinal: normal


Female Genitourinary: deferred


Rectal Exam: deferred


Extremities: abnormal (Left arm fistula with multiple pseudoaneurysms)


Neurological: Normal speech, Normal tone





- Brief post op/procedure progress note


Date of procedure: 09/02/21


Pre-op diagnosis: Central venous stenosis, malfunctioning dialysis access


Post-op diagnosis: same


Procedure: 





Left upper arm fistulogram with central and peripheral venoplasty


Anesthesia: local


Surgeon: RICHMOND JAMES


Estimated blood loss: minimal


Pathology: none


Condition: stable





- Disposition


Condition at discharge: Good


Disposition: 01 HOME / SELF CARE / HOMELESS





Short Stay Discharge Plan


Activity: advance as tolerated


Weight Bearing Status: Weight Bear as Tolerated


Diet: regular


Wound: keep clean and dry, per your surgeon's advice


Follow up with: 


JOHN GRIDER MD [Primary Care Provider] - 7 Days

## 2021-10-19 ENCOUNTER — ERX REFILL RESPONSE (OUTPATIENT)
Dept: URBAN - METROPOLITAN AREA CLINIC 118 | Facility: CLINIC | Age: 56
End: 2021-10-19

## 2021-10-19 RX ORDER — PANTOPRAZOLE SODIUM 40 MG/1
1 TABLET TABLET, DELAYED RELEASE ORAL ONCE A DAY
Qty: 90 TABLET | Refills: 0 | OUTPATIENT

## 2021-10-19 RX ORDER — PANTOPRAZOLE SODIUM 40 MG/1
1 TABLET TABLET, DELAYED RELEASE ORAL ONCE A DAY
Qty: 30 | Refills: 1 | OUTPATIENT

## 2021-10-19 RX ORDER — PANTOPRAZOLE SODIUM 40 MG/1
TAKE 1 TABLET BY MOUTH EVERY DAY TABLET, DELAYED RELEASE ORAL
Qty: 90 TABLET | Refills: 1 | OUTPATIENT

## 2021-11-01 ENCOUNTER — OFFICE VISIT (OUTPATIENT)
Dept: URBAN - METROPOLITAN AREA CLINIC 118 | Facility: CLINIC | Age: 56
End: 2021-11-01

## 2022-03-22 ENCOUNTER — HOSPITAL ENCOUNTER (OUTPATIENT)
Dept: HOSPITAL 5 - CATHLABREC | Age: 57
Discharge: HOME | End: 2022-03-22
Attending: RADIOLOGY
Payer: MEDICARE

## 2022-03-22 VITALS — SYSTOLIC BLOOD PRESSURE: 169 MMHG | DIASTOLIC BLOOD PRESSURE: 105 MMHG
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
## 2022-03-22 DIAGNOSIS — Z79.82: ICD-10-CM

## 2022-03-22 DIAGNOSIS — D64.9: ICD-10-CM

## 2022-03-22 DIAGNOSIS — Z98.890: ICD-10-CM

## 2022-03-22 DIAGNOSIS — E78.00: ICD-10-CM

## 2022-03-22 DIAGNOSIS — K21.9: ICD-10-CM

## 2022-03-22 DIAGNOSIS — I87.1: ICD-10-CM

## 2022-03-22 DIAGNOSIS — G47.30: ICD-10-CM

## 2022-03-22 DIAGNOSIS — N18.6: ICD-10-CM
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
## 2022-03-22 DIAGNOSIS — I12.0: ICD-10-CM

## 2022-03-22 DIAGNOSIS — Z79.899: ICD-10-CM

## 2022-03-22 DIAGNOSIS — T82.591A: Primary | ICD-10-CM

## 2022-03-22 DIAGNOSIS — Z87.01: ICD-10-CM

## 2022-03-22 LAB
APTT BLD: 28.3 SEC. (ref 24.2–36.6)
BUN SERPL-MCNC: 34 MG/DL (ref 7–17)
BUN/CREAT SERPL: 4 %
CALCIUM SERPL-MCNC: 9.7 MG/DL (ref 8.4–10.2)
HCT VFR BLD CALC: 32.5 % (ref 30.3–42.9)
HEMOLYSIS INDEX: 0
HGB BLD-MCNC: 10.3 GM/DL (ref 10.1–14.3)
INR PPP: 1.03 (ref 0.87–1.13)
MCHC RBC AUTO-ENTMCNC: 32 % (ref 30–34)
MCV RBC AUTO: 85 FL (ref 79–97)
PLATELET # BLD: 108 K/MM3 (ref 140–440)
RBC # BLD AUTO: 3.81 M/MM3 (ref 3.65–5.03)

## 2022-03-22 PROCEDURE — 80048 BASIC METABOLIC PNL TOTAL CA: CPT

## 2022-03-22 PROCEDURE — C1894 INTRO/SHEATH, NON-LASER: HCPCS

## 2022-03-22 PROCEDURE — 36415 COLL VENOUS BLD VENIPUNCTURE: CPT

## 2022-03-22 PROCEDURE — 85610 PROTHROMBIN TIME: CPT

## 2022-03-22 PROCEDURE — 85730 THROMBOPLASTIN TIME PARTIAL: CPT

## 2022-03-22 PROCEDURE — C1725 CATH, TRANSLUMIN NON-LASER: HCPCS

## 2022-03-22 PROCEDURE — C1769 GUIDE WIRE: HCPCS

## 2022-03-22 PROCEDURE — 36907 BALO ANGIOP CTR DIALYSIS SEG: CPT

## 2022-03-22 PROCEDURE — 85027 COMPLETE CBC AUTOMATED: CPT

## 2022-03-22 PROCEDURE — C1751 CATH, INF, PER/CENT/MIDLINE: HCPCS

## 2022-03-22 PROCEDURE — 36902 INTRO CATH DIALYSIS CIRCUIT: CPT

## 2022-03-22 NOTE — OPERATIVE REPORT
Operative Report


Operative Report: 


EXAM:


1.  Ultrasound guided access of the AV fistula towards the venous outflow


2.  Fistulogram.


3.  Selection of the IVC.


4.  Angioplasty of the superior SVC and peripheral left innominate vein with a 

10 mm x 4 cm angioplasty balloon and 12 mm x 60 mm angioplasty balloon


5.  Angioplasty of the cephalic arch (central cephalic vein) with a 12 mm x 60 

mm angioplasty





DATE: 3/22/2022





: RICHMOND MA MD





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





MEDICATIONS: Please see nursing report for full details.





DEVICES: 


10 mm x 4 cm angiosculpt angioplasty balloon


12 mm x 6 cm angioplasty balloon





PROCEDURE: 


The risks, benefits, and alternatives of the procedure were discussed and w

ritten informed consent was obtained.  The patient was transported in stable 

condition to the angiography suite.  The patient's left arm AV fistula was 

assessed by ultrasound and was patent.  The patient was prepped and draped in a 

sterile fashion.





Under ultrasound guidance, the left arm AV brachiocephalic fistula was accessed 

with a 21-gauge micropuncture needle.  The area was anesthetized prior to 

access.  0.018 inch wire was advanced through the micropuncture needle into the 

fistula and then the needle was exchanged for a 5 Slovenian transitional dilator.  

The inner dilator and wire were removed and a 0.035 inch wire was advanced 

through the venous outflow.  The transitional dilator was exchanged for a 7 

Slovenian short sheath.





Fistulogram was performed of the venous outflow and central veins. 





The central cephalic vein had a 40% narrowing (at the cephalic arch).  There 

were numerous collaterals arising from the left subclavian and innominate vein. 

The left subclavian and mid and peripheral innominate vein were patent.  The 

central portion of the left innominate vein and the superior portion of the SVC 

was 90% narrowed.  The rest of the SVC was patent.





0.035 inch wire was passed into the IVC and a V 18 wire was passed into the IVC 

to act as a cutting wire. 10 mm x 4 cm cutting angioplasty balloon was used to 

perform cutting angioplasty of the SVC and central left innominate vein.  

Afterwards, the V 18 wire was removed.  





12 mm x 6 cm angioplasty balloon was used to perform angioplasty of the SVC and 

central left innominate vein and then the cephalic arch.  Digital subtraction 

angiography demonstrated 20-30% residual narrowing of the left innominate 

vein/superior portion of the SVC and 20% residual narrowing of the cephalic 

arch.





The wire was removed.  The sheath was then removed.  Hemostasis was achieved 

with slight manual compression.  Pressure bandage applied.  The patient was 

transported from the angiography suite to the floor in stable condition.





IMPRESSION: 


1.  Successful central dialysis access angioplasty.


2.  Successful peripheral dialysis access angioplasty

## 2022-03-22 NOTE — SHORT STAY SUMMARY
Short Stay Documentation


Date of service: 03/22/22


Narrative H&P: 





56-year-old female with SVC syndrome and end-stage renal disease with AV fistula

malfunction.





- History


Principal diagnosis: SVC syndrome


H&P: obtained from office


Past Medical History: dialysis


Social history: no significant social history





- Allergies and Medications


Current Medications: 


                                    Allergies





No Known Allergies Allergy (Verified 06/07/17 12:33)


   





                                Home Medications











 Medication  Instructions  Recorded  Confirmed  Last Taken  Type


 


ALPRAZolam [Xanax TAB] 0.25 mg PO DAILY 06/07/17 03/22/22 03/19/22 History





    1 TAB 


 


Albuterol Sulfate [Albuterol 2 puff INHALATION 4XD PRN 04/11/19 03/22/22 02/28/22 History





Sulfate Hfa]    2 PUFF 


 


Aspirin EC [Halfprin EC] 81 mg PO DAILY 04/11/19 03/22/22 03/19/22 History





    1 TAB 


 


lisinopriL [Prinivil] 5 mg PO DAILY 04/11/19 03/22/22 03/19/22 History





    1 TAB 


 


traMADoL [Ultram 50 MG tab] 50 mg PO Q6H 04/11/19 03/22/22 03/21/22 History





    1 TAB 


 


Hydralazine HCl 50 mg PO DAILY 12/17/19 03/22/22 03/21/22 History





    1 TAB 


 


NIFEdipine XL [Procardia Xl] 60 mg PO DAILY 12/17/19 03/22/22 03/19/22 History





    1 TAB 


 


carvediloL [Coreg] 25 mg PO BID 12/17/19 03/22/22 03/21/22 History





    2 TAB 








Active Medications





Sodium Chloride (Nacl 0.9% 500 Ml)  500 mls @ 50 mls/hr IV AS DIRECT DIANDRA


   Stop: 03/22/22 20:00











- Physical exam


General appearance: no acute distress


HEENT: Other (Head and neck swelling)


Lungs: Normal air movement, Other (Coughing intermittently)


Gastrointestinal: normal


Extremities: normal temperature, normal color





- Brief post op/procedure progress note


Date of procedure: 03/22/22


Pre-op diagnosis: SVC syndrome


Post-op diagnosis: same


Procedure: 





Peripheral dialysis access angioplasty


Central dialysis access angioplasty





Anesthesia: local


Surgeon: RICHMOND MA


Estimated blood loss: minimal


Condition: stable





- Hospital course


Hospital course: 





Patient tolerated the procedure well.  No immediate postprocedural 

complications.





- Disposition


Condition at discharge: Stable


Disposition: 01 HOME / SELF CARE / HOMELESS





- Discharge Diagnoses


(1) ESRD (end stage renal disease) on dialysis


Status: Acute   





(2) Malfunction of arteriovenous shunt


Status: Acute   





(3) SVC syndrome


Status: Acute   





Short Stay Discharge Plan


Activity: advance as tolerated


Weight Bearing Status: Weight Bear as Tolerated


Wound: keep clean and dry


Additional Instructions: 


Follow up with primary medical doctor in 1 week , return to ER for medical 

emergency.


Follow up with: 


JOHN GRIDER MD [Primary Care Provider] - 7 Days


Forms:  AVG Arteriogram D/CInstruction

## 2022-04-22 ENCOUNTER — HOSPITAL ENCOUNTER (OUTPATIENT)
Dept: HOSPITAL 5 - CATHLABREC | Age: 57
Discharge: HOME | End: 2022-04-22
Attending: SURGERY
Payer: MEDICARE

## 2022-04-22 VITALS — DIASTOLIC BLOOD PRESSURE: 91 MMHG | SYSTOLIC BLOOD PRESSURE: 168 MMHG

## 2022-04-22 DIAGNOSIS — N18.6: ICD-10-CM

## 2022-04-22 DIAGNOSIS — E78.5: ICD-10-CM

## 2022-04-22 DIAGNOSIS — I87.1: ICD-10-CM

## 2022-04-22 DIAGNOSIS — Z98.890: ICD-10-CM

## 2022-04-22 DIAGNOSIS — I13.2: ICD-10-CM

## 2022-04-22 DIAGNOSIS — D64.9: ICD-10-CM

## 2022-04-22 DIAGNOSIS — T82.591A: Primary | ICD-10-CM

## 2022-04-22 DIAGNOSIS — Z87.01: ICD-10-CM

## 2022-04-22 DIAGNOSIS — Z79.899: ICD-10-CM

## 2022-04-22 DIAGNOSIS — Z79.82: ICD-10-CM
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
## 2022-04-22 DIAGNOSIS — G47.30: ICD-10-CM
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
## 2022-04-22 DIAGNOSIS — Z72.89: ICD-10-CM

## 2022-04-22 DIAGNOSIS — K21.9: ICD-10-CM

## 2022-04-22 DIAGNOSIS — F41.9: ICD-10-CM

## 2022-04-22 DIAGNOSIS — I50.9: ICD-10-CM

## 2022-04-22 PROCEDURE — C1887 CATHETER, GUIDING: HCPCS

## 2022-04-22 PROCEDURE — 36415 COLL VENOUS BLD VENIPUNCTURE: CPT

## 2022-04-22 PROCEDURE — C1874 STENT, COATED/COV W/DEL SYS: HCPCS

## 2022-04-22 PROCEDURE — C1725 CATH, TRANSLUMIN NON-LASER: HCPCS

## 2022-04-22 PROCEDURE — C1769 GUIDE WIRE: HCPCS

## 2022-04-22 PROCEDURE — C1894 INTRO/SHEATH, NON-LASER: HCPCS

## 2022-04-22 PROCEDURE — 37249 TRLUML BALO ANGIOP ADDL VEIN: CPT

## 2022-04-22 PROCEDURE — 36903 INTRO CATH DIALYSIS CIRCUIT: CPT

## 2022-04-22 PROCEDURE — C1751 CATH, INF, PER/CENT/MIDLINE: HCPCS

## 2022-04-22 PROCEDURE — 82962 GLUCOSE BLOOD TEST: CPT

## 2022-04-22 PROCEDURE — 37248 TRLUML BALO ANGIOP 1ST VEIN: CPT

## 2022-04-22 PROCEDURE — 84132 ASSAY OF SERUM POTASSIUM: CPT

## 2022-04-22 NOTE — SHORT STAY SUMMARY
Short Stay Documentation


Date of service: 04/22/22


Narrative H&P: 





The patient is a 56-year-old female with a history of end-stage renal disease 

who was on hemodialysis through a left arm arteriovenous fistula.  She has a 

known history of central venous occlusion and presented with complaints of 

severe left arm swelling as well as facial swelling with some difficulty 

breathing.  She is in need of a diagnostic fistulogram with possible 

intervention.  She was given the risk, benefits, and alternative procedures and 

consented to the procedure.





- History


Past Medical History: dialysis, ESRD, heart failure, hypertension, 

hyperlipidemia, other (Sleep apnea, superior vena cava syndrome)


Past Surgical History: Other (Creation of left arm arteriovenous fistula)


Social history: no significant social history





- Allergies and Medications


Current Medications: 


                                    Allergies





No Known Allergies Allergy (Verified 06/07/17 12:33)


   





                                Home Medications











 Medication  Instructions  Recorded  Confirmed  Last Taken  Type


 


ALPRAZolam [Xanax TAB] 0.25 mg PO DAILY 06/07/17 04/22/22 04/22/22 06:00 History


 


Albuterol Sulfate [Albuterol 2 puff INHALATION 4XD PRN 04/11/19 04/22/22 04/16/22 History





Sulfate Hfa]    2 puff 


 


Aspirin EC [Halfprin EC] 81 mg PO DAILY 04/11/19 04/22/22 04/20/22 History





    81 mg 


 


traMADoL [Ultram 50 MG tab] 50 mg PO Q6H 04/11/19 04/22/22 04/15/22 History





    50 mg 


 


Hydralazine HCl 50 mg PO DAILY 12/17/19 04/22/22 04/22/22 06:00 History





    50 mg 


 


NIFEdipine XL [Procardia Xl] 60 mg PO DAILY 12/17/19 04/22/22 04/21/22 History





    50 mg 


 


carvediloL [Coreg] 25 mg PO BID 12/17/19 04/22/22 04/21/22 History





    25 mg 


 


Atorvastatin [Lipitor] 80 mg PO HS 04/22/22 04/22/22 04/21/22 History





    80 mg 


 


Cinacalcet HCl [Sensipar] 120 mg PO BID 04/22/22 04/22/22 04/21/22 History





    120 mg 


 


Citalopram Hydrobromide 1 tab PO DAILY 04/22/22 04/22/22 04/22/22 06:00 History





[Citalopram HBr]    1 tab 


 


Folic Acid/Vit B Complex and C 800 mg PO DAILY 04/22/22 04/22/22 04/21/22 

History





[Dialyvite 800 Chewable Wafer]    800 mg 








Active Medications





Sodium Chloride (Nacl 0.9% 500 Ml)  500 mls @ 50 mls/hr IV AS DIRECT DIANDRA


   Stop: 04/22/22 18:00











- Physical exam


General appearance: no acute distress


Lungs: Normal air movement, Other (Breathing is somewhat labored secondary to 

severe head and tongue swelling)


Heart: Regular rate


Gastrointestinal: normal


Female Genitourinary: deferred


Rectal Exam: deferred


Extremities: abnormal (Left arm arteriovenous fistula is significantly dilated, 

increased pulsatility of the left arm AV fistula)





- Brief post op/procedure progress note


Date of procedure: 04/22/22


Pre-op diagnosis: Complications of Dialysis Access


Post-op diagnosis: same


Procedure: 





1.  Access Left Arm AV Fistula with 7 Hungarian Sheath Venous


2.  Diagnostic Fistulogram with Central Venogram


3.  Angioplasty and Stent of Left Arm AV Fistula With 10 x 40 Wall Balloon 

and 10 x 60 Covera Stent Graft


4.  Angioplasty of Left Innominate Vein With 12 x 40 Conquest Balloon


5.  Radiologic Supervision with Interpretation





Anesthesia: local


Surgeon: KATE MARI


Estimated blood loss: minimal


Pathology: none


Condition: stable





- Disposition


Condition at discharge: Good


Disposition: 01 HOME / SELF CARE / HOMELESS





Short Stay Discharge Plan


Activity: other (Okay to use the fistula for dialysis.)


Wound: open to air, keep clean and dry

## 2022-04-22 NOTE — OPERATIVE REPORT
Operative Report


Operative Report: 





Date of Procedure: 4/22/2022





Pre-operative Diagnosis: Complications of Dialysis Access





Post-operative Diagnosis: Same





Procedure(s):


1.  Access Left Arm AV Fistula with 7 [place] Sheath Venous


2.  Diagnostic Fistulogram with Central Venogram


3.  Angioplasty and Stent of Left Arm AV Fistula With 10 x 40 Manns Choice Balloon 

and 10 x 60 Covera Stent Graft


4.  Angioplasty of Left Innominate Vein With 12 x 40 Conquest Balloon


5.  Radiologic Supervision with Interpretation





Surgeon: Jer Oquendo M.D. 





Assistant: None





Anesthesia: 2% Lidocaine





EBL: Minimal





Counts: Correct





Complications: None





Condition: Stable





Specimen: None





Indication:





The patient is a 56-year-old female with a history of end-stage renal disease 

and known supra vena cava syndrome.  She presented with complaints of left arm 

and facial swelling with difficulty breathing.  She is in need of a diagnostic 

fistulogram with possible intervention.  She was given the risk, benefits, and 

alternative procedures and consented to the procedure.





Angiographic Findings:





The diagnostic fistulogram revealed a diffusely enlarged and left brachi

ocephalic arteriovenous fistula.  There was approximately 85% stenosis in the 

mid fistula and approximately 75% stenosis of the cephalic arch.  There was 90% 

stenosis of the proximal innominate vein.  The remainder the central venous 

system appeared to be patent without significant flow-limiting stenosis.





After intervention the residual stenosis within the cannulation zone of the 

fistula was approximately 40%.  The residual stenosis within the cephalic arch 

was reduced to approximately 20%.  The residual stenosis within the innominate 

vein was reduced to less than 15%.





Description of Procedure:





The patient was brought to the Cath Lab and laid in supine position.  After a 

timeout was performed her left arm was prepped and draped in normal sterile 

fashion.  2% lidocaine was used anesthetize skin and soft tissue overlying the 

fistula, near the arterial inflow, and a 21-gauge micropuncture needle was used 

to access the fistula towards the venous outflow.  A 0.018 micropuncture wire 

was advanced into the fistula and after removing the needle a 7 [place] sheath 

was placed by Seldinger technique.  A diagnostic fistulogram with central 

venogram was performed to previously described findings.  I made multiple 

attempts to advance a catheter and wire through the areas of stenosis within the

fistula and was eventually able to advance the wire into the cephalic arch and 

central venous system however I was unable to advance the catheter over the 

wire.  I decided to perform angioplasty of the cannulation zone of the fistula 

as well as the cephalic arch using a 10 x 40 Manns Choice Balloon.  The stenosis 

within the cannulation zone was reduced to approximately 40% however there was 

near complete recoil of the stenosis within the cephalic arch however I was able

to advance the advantage wire and Fonseca cross catheter into the central venous 

system and eventually into the inferior vena cava.  I exchanged my 7 [place] 4 cm

sheath for a 7 [place] 23 cm sheath secondary to difficulty advancing the 

catheter into the central venous system.  I then performed angioplasty of the 

stenosis within the innominate vein using a 10 x 40 EverCross balloon which 

resulted in approximately 80% residual stenosis.  I then exchanged the 7 [place] 

sheath for an 8 [place] 45 cm destination sheath and then performed angioplasty 

of the innominate vein using a 12 x 40 conquest balloon which resulted in less 

than 15% residual stenosis.  I then uses balloon to perform angioplasty of the 

cephalic arch which resulted in near total occlusion of the arch and increased 

pulsatility of the fistula.  I advanced a 10 x 60 Covera stent graft into the 

cephalic arch and then postdilated it with a 10 x 40 Manns Choice Balloon.  This 

resulted in approximately 20% residual stenosis.  At that point I remove the 

balloon and wire and used a 2-0 chromic in pursestring fashion to close the 

entry site after removing the sheath.  A sterile dressing was then applied to 

the entry site and the patient was transported to the recovery area in stable 

condition.

## 2022-07-19 ENCOUNTER — HOSPITAL ENCOUNTER (OUTPATIENT)
Dept: HOSPITAL 5 - CATHLABREC | Age: 57
Discharge: HOME | End: 2022-07-19
Attending: RADIOLOGY
Payer: MEDICARE

## 2022-07-19 VITALS — SYSTOLIC BLOOD PRESSURE: 170 MMHG | DIASTOLIC BLOOD PRESSURE: 100 MMHG

## 2022-07-19 DIAGNOSIS — Z79.899: ICD-10-CM

## 2022-07-19 DIAGNOSIS — F41.9: ICD-10-CM

## 2022-07-19 DIAGNOSIS — D64.9: ICD-10-CM
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
## 2022-07-19 DIAGNOSIS — I87.323: ICD-10-CM
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
## 2022-07-19 DIAGNOSIS — N18.6: ICD-10-CM

## 2022-07-19 DIAGNOSIS — E78.00: ICD-10-CM

## 2022-07-19 DIAGNOSIS — Z79.82: ICD-10-CM

## 2022-07-19 DIAGNOSIS — K21.9: ICD-10-CM

## 2022-07-19 DIAGNOSIS — T82.591A: ICD-10-CM

## 2022-07-19 DIAGNOSIS — I12.0: ICD-10-CM

## 2022-07-19 DIAGNOSIS — Z98.890: ICD-10-CM

## 2022-07-19 DIAGNOSIS — G47.30: ICD-10-CM

## 2022-07-19 DIAGNOSIS — I87.1: ICD-10-CM

## 2022-07-19 DIAGNOSIS — Z72.89: ICD-10-CM

## 2022-07-19 DIAGNOSIS — T82.898A: Primary | ICD-10-CM

## 2022-07-19 LAB
APTT BLD: 28.3 SEC. (ref 24.2–36.6)
BUN SERPL-MCNC: 19 MG/DL (ref 7–17)
BUN/CREAT SERPL: 3 %
CALCIUM SERPL-MCNC: 8.7 MG/DL (ref 8.4–10.2)
HCT VFR BLD CALC: 31.6 % (ref 30.3–42.9)
HEMOLYSIS INDEX: 8
HGB BLD-MCNC: 9.8 GM/DL (ref 10.1–14.3)
INR PPP: 0.95 (ref 0.87–1.13)
MCHC RBC AUTO-ENTMCNC: 31 % (ref 30–34)
MCV RBC AUTO: 91 FL (ref 79–97)
PLATELET # BLD: 120 K/MM3 (ref 140–440)
RBC # BLD AUTO: 3.47 M/MM3 (ref 3.65–5.03)

## 2022-07-19 PROCEDURE — 36903 INTRO CATH DIALYSIS CIRCUIT: CPT

## 2022-07-19 PROCEDURE — 85730 THROMBOPLASTIN TIME PARTIAL: CPT

## 2022-07-19 PROCEDURE — 85610 PROTHROMBIN TIME: CPT

## 2022-07-19 PROCEDURE — C1894 INTRO/SHEATH, NON-LASER: HCPCS

## 2022-07-19 PROCEDURE — 36415 COLL VENOUS BLD VENIPUNCTURE: CPT

## 2022-07-19 PROCEDURE — C1874 STENT, COATED/COV W/DEL SYS: HCPCS

## 2022-07-19 PROCEDURE — C1751 CATH, INF, PER/CENT/MIDLINE: HCPCS

## 2022-07-19 PROCEDURE — 80048 BASIC METABOLIC PNL TOTAL CA: CPT

## 2022-07-19 PROCEDURE — C1725 CATH, TRANSLUMIN NON-LASER: HCPCS

## 2022-07-19 PROCEDURE — C1769 GUIDE WIRE: HCPCS

## 2022-07-19 PROCEDURE — 85027 COMPLETE CBC AUTOMATED: CPT

## 2022-07-19 RX ADMIN — CEFAZOLIN ONE MLS: 330 INJECTION, POWDER, FOR SOLUTION INTRAMUSCULAR; INTRAVENOUS at 10:31

## 2022-07-19 RX ADMIN — LIDOCAINE HYDROCHLORIDE AND EPINEPHRINE ONE ML: 20; 5 INJECTION, SOLUTION EPIDURAL; INFILTRATION; INTRACAUDAL; PERINEURAL at 10:40

## 2022-07-19 RX ADMIN — CEFAZOLIN ONE MLS: 330 INJECTION, POWDER, FOR SOLUTION INTRAMUSCULAR; INTRAVENOUS at 08:35

## 2022-07-19 RX ADMIN — LIDOCAINE HYDROCHLORIDE AND EPINEPHRINE ONE ML: 20; 5 INJECTION, SOLUTION EPIDURAL; INFILTRATION; INTRACAUDAL; PERINEURAL at 08:34

## 2022-07-19 NOTE — SHORT STAY SUMMARY
Short Stay Documentation


Date of service: 07/19/22


Narrative H&P: 








56-year-old female with SVC syndrome and end-stage renal disease with AV fistula

malfunction.





- History


Principal diagnosis: SVC syndrome, AVF malfunction


H&P: obtained from office





- Allergies and Medications


Current Medications: 


                                    Allergies





No Known Allergies Allergy (Verified 06/07/17 12:33)


   





                                Home Medications











 Medication  Instructions  Recorded  Confirmed  Last Taken  Type


 


ALPRAZolam [Xanax TAB] 0.25 mg PO DAILY 06/07/17 04/22/22 07/18/22 History





    1 TAB 


 


Albuterol Sulfate [Albuterol 2 puff INHALATION 4XD PRN 04/11/19 07/19/22 07/13/22 History





Sulfate Hfa]    1 TAB 


 


Aspirin EC [Halfprin EC] 81 mg PO DAILY 04/11/19 07/19/22 07/18/22 History





    1 TAB 


 


traMADoL [Ultram 50 MG tab] 50 mg PO Q6H 04/11/19 07/19/22 07/18/22 History





    1 TAB 


 


Hydralazine HCl 50 mg PO DAILY 12/17/19 07/19/22 07/19/22 History





    1 TAB 


 


NIFEdipine XL [Procardia Xl] 60 mg PO DAILY 12/17/19 07/19/22 07/18/22 History





    1 TAB 


 


carvediloL [Coreg] 25 mg PO BID 12/17/19 07/19/22 07/18/22 History


 


Atorvastatin [Lipitor] 80 mg PO HS 04/22/22 07/19/22 04/21/22 History





    80 mg 


 


Cinacalcet HCl [Sensipar] 120 mg PO BID 04/22/22 07/19/22 07/18/22 History





    1 TAB 


 


Citalopram Hydrobromide 1 tab PO DAILY 04/22/22 07/19/22 07/17/22 History





[Citalopram HBr]    1 TAB 








Active Medications





Sodium Chloride (Nacl 0.9% 500 Ml)  500 mls @ 50 mls/hr IV AS DIRECT DIANDRA


   Stop: 07/19/22 15:00


   Last Admin: 07/19/22 08:35 Dose:  300 mls


   











- Physical exam


General appearance: no acute distress


HEENT: Other (Head and neck swelling)


Lungs: Other (Accessory muscle use)


Gastrointestinal: normal


Extremities: abnormal (Swelling of head and left arm, thrill in left AV fistula)





- Brief post op/procedure progress note


Date of procedure: 07/19/22


Pre-op diagnosis: SVC syndrome, AV fistula malfunction


Post-op diagnosis: same


Procedure: 





1.  Ultrasound-guided access of the left arm cephalic vein (brachial artery 

cephalic vein AV fistula).


2.  Fistulogram.


3.  Angioplasty of the SVC and left innominate vein with a 12 mm x 60 mm 

angioplasty balloon


4.  Stenting of the left distal innominate vein with a 13.5 mm x 80 mm fluency 

stent graft postdilated with a 12 mm x 40 mm conquest and 14 mm x 40 mm Buckley


 


Anesthesia: local


Surgeon: RICHMOND MA


Estimated blood loss: minimal


Condition: stable





- Hospital course


Hospital course: 





The patient was kept for a few hours to monitor the access site which had no 

issues.  She was discharged in stable condition.





- Disposition


Condition at discharge: Stable


Disposition: 01 HOME / SELF CARE / HOMELESS





- Discharge Diagnoses


(1) ESRD (end stage renal disease) on dialysis


Status: Acute   





(2) Malfunction of arteriovenous shunt


Status: Acute   





(3) SVC syndrome


Status: Acute   





Short Stay Discharge Plan


Activity: advance as tolerated


Weight Bearing Status: Weight Bear as Tolerated


Diet: renal


Wound: keep clean and dry


Additional Instructions: 


Follow up with primary Medical doctor in 1 week, return to Emergency Room for 

medical emergency.


Follow up with: 


JOHN GRIDER MD [Primary Care Provider] - 7 Days


Forms:  AVG Arteriogram D/CInstruction

## 2022-09-18 ENCOUNTER — OUT OF OFFICE VISIT (OUTPATIENT)
Dept: URBAN - METROPOLITAN AREA MEDICAL CENTER 16 | Facility: MEDICAL CENTER | Age: 57
End: 2022-09-18
Payer: COMMERCIAL

## 2022-09-18 DIAGNOSIS — Z79.01 ANTICOAGULANT LONG-TERM USE: ICD-10-CM

## 2022-09-18 DIAGNOSIS — K62.5 ANAL BLEEDING: ICD-10-CM

## 2022-09-18 PROCEDURE — 99222 1ST HOSP IP/OBS MODERATE 55: CPT | Performed by: INTERNAL MEDICINE

## 2022-09-18 PROCEDURE — G8427 DOCREV CUR MEDS BY ELIG CLIN: HCPCS | Performed by: INTERNAL MEDICINE

## 2022-09-19 ENCOUNTER — OUT OF OFFICE VISIT (OUTPATIENT)
Dept: URBAN - METROPOLITAN AREA MEDICAL CENTER 16 | Facility: MEDICAL CENTER | Age: 57
End: 2022-09-19
Payer: COMMERCIAL

## 2022-09-19 DIAGNOSIS — K92.1 ACUTE MELENA: ICD-10-CM

## 2022-09-19 PROCEDURE — 99232 SBSQ HOSP IP/OBS MODERATE 35: CPT
